# Patient Record
Sex: MALE | Race: WHITE
[De-identification: names, ages, dates, MRNs, and addresses within clinical notes are randomized per-mention and may not be internally consistent; named-entity substitution may affect disease eponyms.]

---

## 2018-01-26 ENCOUNTER — HOSPITAL ENCOUNTER (EMERGENCY)
Dept: HOSPITAL 44 - ED | Age: 73
Discharge: HOME | End: 2018-01-26
Payer: COMMERCIAL

## 2018-01-26 VITALS — SYSTOLIC BLOOD PRESSURE: 110 MMHG | DIASTOLIC BLOOD PRESSURE: 72 MMHG

## 2018-01-26 DIAGNOSIS — S05.02XA: Primary | ICD-10-CM
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
## 2018-01-26 PROCEDURE — 99283 EMERGENCY DEPT VISIT LOW MDM: CPT

## 2018-01-26 PROCEDURE — 99282 EMERGENCY DEPT VISIT SF MDM: CPT

## 2018-01-26 RX ADMIN — TETRACAINE HYDROCHLORIDE ONE DROP: 5 SOLUTION OPHTHALMIC at 13:58

## 2018-01-26 RX ADMIN — TETRACAINE HYDROCHLORIDE ONE: 5 SOLUTION OPHTHALMIC at 14:01

## 2018-01-26 NOTE — ED PHYSICIAN DOCUMENTATION
Eye Problem





- HISTORIAN


Historian: patient





- HPI


Stated Complaint: eye pain


Chief Complaint: Eye Problems


Onset: days ago (2 days ago.)


Associated symptoms: pain, burining, foreign body


Location: left eye


Severity: moderate


Apparent Injury: no


Context: foreign body


Where: home


Further Comments: yes (72 year old male patient presents with  complaint of 

left eye pain. Patient states he was grinding metal 2 days ago, unsure if some 

could not gotten in his eye.  C/O FB sensation under eye lid.)





- ROS


CONST: no problems


MS/SKIN/LYMPH: denies: weakness, numbness, neck pain, back pain, ankle swelling

, leg swelling, rash, other


CVS/RESP: none


EYES/ENT: none


GI/: denies: problems urinating, nausea, vomiting, other


NEURO: denies: headache





- PAST HX


Past History: diabetes Type 2, hypertension, other (GERD)


Allergies/Adverse Reactions: 


 Allergies











Allergy/AdvReac Type Severity Reaction Status Date / Time


 


No Known Allergies Allergy   Unverified 01/26/18 13:58














Home Medications: 


 Ambulatory Orders











 Medication  Instructions  Recorded


 


Aspirin [Enriqueta] 81 mg PO DAILY 01/26/18


 


Atorvastatin Calcium 20 mg PO QDAY 01/26/18


 


Docusate Sodium [Colace] 100 mg PO BID 01/26/18


 


Doxazosin Mesylate [Cardura] 2 mg PO HS 01/26/18


 


Levocetirizine Dihydrochloride 5 mg PO QDAY 01/26/18





[Xyzal]  


 


Lisinopril [Zestril] 40 mg PO BID 01/26/18


 


Meloxicam [Mobic] 15 mg PO QDAY 01/26/18


 


Metoprolol Succinate [Toprol Xl] 50 mg PO DAILY 01/26/18


 


Pantoprazole Sodium [Protonix] 40 mg PO 0700 01/26/18


 


Sertraline HCl [Zoloft] 100 mg PO DAILY 01/26/18


 


Testosterone Cypionate [Testone 200 mg IM Q7DAYS 01/26/18





Cik]  


 


Tramadol HCl/Acetaminophen 2 each PO BID 01/26/18





[Ultracet Tablet]  


 


amLODIPine BESYLATE [Norvasc] 10 mg PO 0900 01/26/18














- SOCIAL HX


Smoking History: non-smoker





- FAMILY HX


Family History: denies: none





- VITAL SIGNS


Vital Signs: 





 Vital Signs











Temp Pulse Resp BP Pulse Ox


 


 98.3 F   87   16   110/72   95 


 


 01/26/18 13:43  01/26/18 13:43  01/26/18 13:43  01/26/18 13:43  01/26/18 13:43














- REVIEWED ASSESSMENTS


Nursing Assessment  Reviewed: Yes


Vitals Reviewed: Yes





ED Results Lab/Radiology





- Orders


Orders: 





 ED Orders











 Category Date Time Status


 


 Tetracaine HCl/Pf [Pontocaine 5% Opth] Med  01/26/18 13:57 Discontinued





 2 drop OP NOW ONE   


 


 Tetracaine HCl/Pf [Pontocaine 5% Opth] Med  01/26/18 13:55 Discontinued





 30 drop OP .STK-MED ONE   














Eye Problem  Physical Exam





- Physical Exam


General Appearance: moderate distress


Visual Acuity: see nursing assessment


Eyelids: nml inspection


Conjunctiva and Sclera: injected (L).  No: exudate (L), foreign material (L)


Corneas: abrasion (L) (2mm at 0900), fluorescein dye uptake (L) (at 0900 

position, 2 mm)


EOM: intact


Pupils: equal


Head/ENT: nml inspection


Skin: nml color, warm, skin intact


Respiratory: no resp distress


CVS: reg rate & rhythm


Neuro/Psych: oriented x3, neuro intact, mood/affect nml, CN's nml as tested





Discharge


Clincal Impression: 


Corneal abrasion


Qualifiers:


 Encounter type: initial encounter Laterality: left Qualified Code(s): S05.02XA 

- Injury of conjunctiva and corneal abrasion without foreign body, left eye, 

initial encounter





Referrals: 


Primary Doctor,No [Primary Care Provider] - 2 Days


Additional Instructions: 


Follow up with your eye doctor as soon as possible for re-evaluation of your 

left eye. 





tylenol or ibuprofen as needed for discomfort. 


Condition: Stable


Disposition: 01 HOME, SELF-CARE


Decision to Admit: NO


Decision Time: 14:02